# Patient Record
Sex: FEMALE | Race: WHITE | Employment: OTHER
[De-identification: names, ages, dates, MRNs, and addresses within clinical notes are randomized per-mention and may not be internally consistent; named-entity substitution may affect disease eponyms.]

---

## 2024-05-29 ENCOUNTER — HOSPITAL ENCOUNTER (OUTPATIENT)
Facility: HOSPITAL | Age: 84
Setting detail: RECURRING SERIES
Discharge: HOME | End: 2024-06-01
Payer: MEDICARE

## 2024-05-29 PROCEDURE — 97535 SELF CARE MNGMENT TRAINING: CPT

## 2024-05-29 PROCEDURE — 97110 THERAPEUTIC EXERCISES: CPT

## 2024-05-29 PROCEDURE — 97162 PT EVAL MOD COMPLEX 30 MIN: CPT

## 2024-05-29 NOTE — THERAPY EVALUATION
ANI DORADO [facility]  [address], [address], [place] [address] Ph:[phone] Fx: [phone]  Plan of Care / Statement of Necessity for Physical Therapy Services     Patient Name: Kajal Mitchell : 1940   Medical   Diagnosis: Left wrist pain [M25.532] Treatment Diagnosis: M25.532  LEFT WRIST PAIN       Onset Date:      Referral Source: Adelfo Orosco MD Start of Care (SOC): 2024   Prior Hospitalization: See medical history Provider #: [id number]   Prior Level of Function: Chronic pain in the wrist with lifting    Comorbidities: Chronic bronchitis, arthritis, Skin cancer, Pre lukemia of bone, HTN, Thyroid problems, alcohol use     Assessment / key information:  Kajal Mitchell is a 83 y.o. female with Dx: L wrist pain along the radial side starting in  after trying to lift a heavy bag and felt pain. Has had an injection for pain control which helped quite a bit. Had x-rays which were neg for fractures per pt report; unsure if it showed arthritis. Wears a splint a night to reduce flexion and radial deviation. Has more pain with gripping and pulling, twisting, and certain random motions. Reports her hands will fall asleep at night occasionally.  Notes she plays golf and it doesn't hurt with her swing, but hurts to carry her bag. Pt rates pain as 9/10 max, 0/10 at best, 0/10 currently.    Objective:    Palpation TTP along ABDuctor pollicis > Extensor pollicis, scaphoid.   Wrist ROM Flexion R 60 L 71, Ext R 72 L 68, RD R 28 L 16, UD R 45 L 32, Sup R 95 L 96, Pron R 96 L 97  Strength: Flexion R 4/5 L 4-/5, Ext R 4/5 L 4-/5, RD R 4/5 L 4/5, UD R 4/5 L 4/5, Sup R 4/5 L 3+/5, Pron R 4/5 L 3+/5, Discomfort with resistance to thumb extension and ABD  Special Tests: + Finkelstein    strength in kgs: R 14, 12, 14; L 10, 10, 10  Current deficits include decreased  and wrist strength consistent with chronic pollicis tendon strain and

## 2024-05-29 NOTE — PROGRESS NOTES
PHYSICAL / OCCUPATIONAL THERAPY - DAILY TREATMENT NOTE (updated )  For Eval visit    Patient Name: Kajal Mitchell    Date: 2024    : 1940  Insurance: Payor: MEDICARE / Plan: MEDICARE PART A AND B / Product Type: *No Product type* /      Patient  verified yes     Visit #   Current / Total 1 16   Time   In / Out 1:45 2:23   Pain   In / Out 0 0   Subjective Functional Status/Changes: See POC     TREATMENT AREA =  Left wrist pain [M25.532]    OBJECTIVE           15 min   Eval - untimed                      Therapeutic Procedures:  Tx Min Billable or 1:1 Min (if diff from Tx Min) Procedure, Rationale, Specifics   8  31266 Therapeutic Exercise (timed):  increase ROM, strength, coordination, balance, and proprioception to improve patient's ability to progress to PLOF and address remaining functional goals. (see flow sheet as applicable)     Details if applicable:       42450 Neuromuscular Re-Education (timed):  improve balance, coordination, kinesthetic sense, posture, core stability and proprioception to improve patient's ability to develop conscious control of individual muscles and awareness of position of extremities in order to progress to PLOF and address remaining functional goals. (see flow sheet as applicable)     Details if applicable:       65478 Manual Therapy (timed):  decrease pain, increase ROM, increase tissue extensibility, and decrease trigger points to improve patient's ability to progress to PLOF and address remaining functional goals.  The manual therapy interventions were performed at a separate and distinct time from the therapeutic activities interventions . (see flow sheet as applicable)     Details if applicable:     15  11094 Self Care/Home Management (timed):  improve patient knowledge and understanding of pain reducing techniques, positioning, posture/ergonomics, home safety, activity modification, diagnosis/prognosis, and physical therapy expectations, procedures and

## 2024-06-05 ENCOUNTER — HOSPITAL ENCOUNTER (OUTPATIENT)
Facility: HOSPITAL | Age: 84
Setting detail: RECURRING SERIES
Discharge: HOME | End: 2024-06-08
Payer: MEDICARE

## 2024-06-05 PROCEDURE — 97110 THERAPEUTIC EXERCISES: CPT

## 2024-06-05 PROCEDURE — 97140 MANUAL THERAPY 1/> REGIONS: CPT

## 2024-06-05 PROCEDURE — 97112 NEUROMUSCULAR REEDUCATION: CPT

## 2024-06-05 PROCEDURE — 97530 THERAPEUTIC ACTIVITIES: CPT

## 2024-06-05 NOTE — PROGRESS NOTES
posture/ergonomics, home safety, activity modification, diagnosis/prognosis, and physical therapy expectations, procedures and progression  to improve patient's ability to progress to PLOF and address remaining functional goals.  (see flow sheet as applicable)     Details if applicable:     38  [facility] Totals Reminder: bill using total billable min of TIMED therapeutic procedures (example: do not include dry needle or estim unattended, both untimed codes, in totals to left)  8-22 min = 1 unit; 23-37 min = 2 units; 38-52 min = 3 units; 53-67 min = 4 units; 68-82 min = 5 units   Total Total     [x]  Patient Education billed concurrently with other procedures   [x] Review HEP    [] Progressed/Changed HEP, detail:    [] Other detail:       Objective Information/Functional Measures/Assessment    MT for joint mobs along the wrist and thumb f/b exercises and rationale for therex. Answered all pt questions and given new HEP using hammer for RD and supination/pronation. Explained the reasoning for holds with thumb extension and eccentric motions to improve extensor tendon health. Will progress as tolerated.    Patient will continue to benefit from skilled PT / OT services to modify and progress therapeutic interventions, analyze and address functional mobility deficits, analyze and address ROM deficits, analyze and address strength deficits, analyze and address soft tissue restrictions, analyze and cue for proper movement patterns, analyze and modify for postural abnormalities, and instruct in home and community integration to address functional deficits and attain remaining goals.    Progress toward goals / Updated goals:  []  See PN    Semi compliant with HEP    Next PN / RC due: 6/29/24  Auth due: as med nec 12/31/24    PLAN  yes Continue plan of care  [x]  Upgrade activities as tolerated  []  Discharge due to :  []  Other:    Davidson Aguilera, PT    6/5/2024    12:24 PM    Future Appointments   Date Time Provider Department

## 2024-06-12 ENCOUNTER — HOSPITAL ENCOUNTER (OUTPATIENT)
Facility: HOSPITAL | Age: 84
Setting detail: RECURRING SERIES
Discharge: HOME | End: 2024-06-15
Payer: MEDICARE

## 2024-06-12 PROCEDURE — 97112 NEUROMUSCULAR REEDUCATION: CPT

## 2024-06-12 PROCEDURE — 97110 THERAPEUTIC EXERCISES: CPT

## 2024-06-12 PROCEDURE — 97140 MANUAL THERAPY 1/> REGIONS: CPT

## 2024-06-12 NOTE — PROGRESS NOTES
PHYSICAL / OCCUPATIONAL THERAPY - DAILY TREATMENT NOTE (updated )    Patient Name: Kajal Mitchell    Date: 2024    : 1940  Insurance: Payor: MEDICARE / Plan: MEDICARE PART A AND B / Product Type: *No Product type* /      Patient  verified yes     Visit #   Current / Total 3 16   Time   In / Out 3:00 3:38   Pain   In / Out 2 2   Subjective Functional Status/Changes: She played 18holes and it went good. Pt relays her medical history. Says she thinks lifting her golf bag awkwardly was the cause. No soreness after last time. She will play golf on Friday for her Birthday.      TREATMENT AREA =  Left wrist pain [M25.532]    OBJECTIVE      Therapeutic Procedures:  Tx Min Billable or 1:1 Min (if diff from Tx Min) Procedure, Rationale, Specifics   8  66646 Therapeutic Exercise (timed):  increase ROM, strength, coordination, balance, and proprioception to improve patient's ability to progress to PLOF and address remaining functional goals. (see flow sheet as applicable)     Details if applicable:     15  30660 Neuromuscular Re-Education (timed):  improve balance, coordination, kinesthetic sense, posture, core stability and proprioception to improve patient's ability to develop conscious control of individual muscles and awareness of position of extremities in order to progress to PLOF and address remaining functional goals. (see flow sheet as applicable)     Details if applicable:     15  26740 Manual Therapy (timed):  decrease pain, increase ROM, increase tissue extensibility, and decrease trigger points to improve patient's ability to progress to PLOF and address remaining functional goals.  The manual therapy interventions were performed at a separate and distinct time from the therapeutic activities interventions . (see flow sheet as applicable)     Details if applicable:  mobs/PROM to wrist all directions, Thumb distraction and oscillations, wrist oscillations. Thumb ABD stretch and CMC mobs for

## 2024-06-19 ENCOUNTER — HOSPITAL ENCOUNTER (OUTPATIENT)
Facility: HOSPITAL | Age: 84
Setting detail: RECURRING SERIES
Discharge: HOME | End: 2024-06-22
Payer: MEDICARE

## 2024-06-19 PROCEDURE — 97140 MANUAL THERAPY 1/> REGIONS: CPT

## 2024-06-19 PROCEDURE — 97535 SELF CARE MNGMENT TRAINING: CPT

## 2024-06-19 PROCEDURE — 97110 THERAPEUTIC EXERCISES: CPT

## 2024-06-19 NOTE — PROGRESS NOTES
PHYSICAL / OCCUPATIONAL THERAPY - DAILY TREATMENT NOTE (updated )    Patient Name: Kajal Mitchell    Date: 2024    : 1940  Insurance: Payor: MEDICARE / Plan: MEDICARE PART A AND B / Product Type: *No Product type* /      Patient  verified yes     Visit #   Current / Total 4 16   Time   In / Out 1:40 2:18   Pain   In / Out 0 0   Subjective Functional Status/Changes: Played golf and some fatigue ni the hand and the wrist.      TREATMENT AREA =  Left wrist pain [M25.532]    OBJECTIVE      Therapeutic Procedures:  Tx Min Billable or 1:1 Min (if diff from Tx Min) Procedure, Rationale, Specifics   15  86956 Therapeutic Exercise (timed):  increase ROM, strength, coordination, balance, and proprioception to improve patient's ability to progress to PLOF and address remaining functional goals. (see flow sheet as applicable)     Details if applicable:       82870 Neuromuscular Re-Education (timed):  improve balance, coordination, kinesthetic sense, posture, core stability and proprioception to improve patient's ability to develop conscious control of individual muscles and awareness of position of extremities in order to progress to PLOF and address remaining functional goals. (see flow sheet as applicable)     Details if applicable:     15  91452 Manual Therapy (timed):  decrease pain, increase ROM, increase tissue extensibility, and decrease trigger points to improve patient's ability to progress to PLOF and address remaining functional goals.  The manual therapy interventions were performed at a separate and distinct time from the therapeutic activities interventions . (see flow sheet as applicable)     Details if applicable:  mobs/PROM to wrist all directions, Thumb distraction and oscillations, wrist oscillations. Thumb ABD stretch and CMC mobs for ABD   8  63580 Self Care/Home Management (timed):  improve patient knowledge and understanding of pain reducing techniques, positioning,

## 2024-06-21 ENCOUNTER — APPOINTMENT (OUTPATIENT)
Facility: HOSPITAL | Age: 84
End: 2024-06-21
Payer: MEDICARE

## 2024-06-24 ENCOUNTER — HOSPITAL ENCOUNTER (OUTPATIENT)
Facility: HOSPITAL | Age: 84
Setting detail: RECURRING SERIES
Discharge: HOME | End: 2024-06-27
Payer: MEDICARE

## 2024-06-24 PROCEDURE — 97140 MANUAL THERAPY 1/> REGIONS: CPT

## 2024-06-24 PROCEDURE — 97110 THERAPEUTIC EXERCISES: CPT

## 2024-06-24 NOTE — PROGRESS NOTES
PHYSICAL / OCCUPATIONAL THERAPY - DAILY TREATMENT NOTE (updated )    Patient Name: Kajal Mitchell    Date: 2024    : 1940  Insurance: Payor: MEDICARE / Plan: MEDICARE PART A AND B / Product Type: *No Product type* /      Patient  verified yes     Visit #   Current / Total 5 16   Time   In / Out 946 AM 1020 AM   Pain   In / Out 0/10 0/10   Subjective Functional Status/Changes: Friday morning played golf and did ok. Had an \"accident\" at 230 AM on Saturday(24) - vomitted a lot due to prilosec. Pt reports also using a  on Sat and used it for about 10 minutes then had to stop because hurt but also cause it didn't help.    Pt reports she doesn't have a MD appt to discuss her hand. But she also doesn't want to see the hand specialist who gave her the injections again because they weren't pro PT. Reports PT has helped her a lot w/ ADLs and golfing. Patient denies falls or red flags since last visit.    Pt arrived 6 minutes past appt time     TREATMENT AREA =  Left wrist pain [M25.532]    OBJECTIVE      Therapeutic Procedures:  Tx Min Billable or 1:1 Min (if diff from Tx Min) Procedure, Rationale, Specifics   14  07370 Therapeutic Exercise (timed):  increase ROM, strength, coordination, balance, and proprioception to improve patient's ability to progress to PLOF and address remaining functional goals. (see flow sheet as applicable)     Details if applicable:       13198 Neuromuscular Re-Education (timed):  improve balance, coordination, kinesthetic sense, posture, core stability and proprioception to improve patient's ability to develop conscious control of individual muscles and awareness of position of extremities in order to progress to PLOF and address remaining functional goals. (see flow sheet as applicable)     Details if applicable:     20  20962 Manual Therapy (timed):  decrease pain, increase ROM, increase tissue extensibility, and decrease trigger points to improve

## 2024-06-26 ENCOUNTER — HOSPITAL ENCOUNTER (OUTPATIENT)
Facility: HOSPITAL | Age: 84
Setting detail: RECURRING SERIES
Discharge: HOME | End: 2024-06-29
Payer: MEDICARE

## 2024-06-26 PROCEDURE — 97535 SELF CARE MNGMENT TRAINING: CPT

## 2024-06-26 PROCEDURE — 97140 MANUAL THERAPY 1/> REGIONS: CPT

## 2024-06-26 PROCEDURE — 97110 THERAPEUTIC EXERCISES: CPT

## 2024-06-26 NOTE — THERAPY RECERTIFICATION
ANI DORADO [facility]  [address],[place] [address] - Ph: [phone]  Fx: [phone]  PHYSICAL THERAPY PROGRESS NOTE  Patient Name: Kajal Mitchell : 1940   Treatment/Medical Diagnosis: Left wrist pain [M25.532] / M25.532  LEFT WRIST PAIN    Referral Source: Adelfo Orosco MD     Date of Initial Visit: 24 Attended Visits: 6 Missed Visits: 0     SUMMARY OF TREATMENT  Pt seen in clinic for to address Left wrist pain [M25.532]. Pt has been assessed, completed therapeutic exercises, neuromuscular re-ed, therapeutic functional activity, received manual therapy intervention, self-care strategies, HEP techniques and pt ed on condition consistency and follow-through. -    Subjective: Pt reports 40% of where patient wants to be. Pt reports improvements in strength, less specific to the thumb, pain intensity is down, the frequency is less but she is avoiding things. Pt reports continued difficulties in fatigue in the arm and wrist after using a  or playing 18 holes of golf.     CURRENT STATUS  Pt seen for 6 visits to address left wrist pain. Pt subjectively reports \"better\" but is unable to quantify her progress in clinic. Overall seemingly progressing well through rehab at this time. Pt does fatigue in session and demonstrates overall weakness and poor tolerance to \"discomfort\" during drills. Will continue to progress strength and function as able     GOALS:  Short Term Goals: To be accomplished in 4 weeks  Independent with HEP to progress to meet goals. Variable (24)  2. Pt to report decreased max pain levels less than 6/10 for improvement in ADLs. 0-1/10 \"but I am fatigued with activity\" (24  3. Pt to report 50% improvement in pain with lifting to improve golf activities. \"It's less, but how much less, I don't know\" (24)     Long Term Goals: To be accomplished in 8 weeks  Improve functional outcome score

## 2024-06-26 NOTE — PROGRESS NOTES
wrist all directions; PA carpal mobilizations   10  36065 Self Care/Home Management (timed):  improve patient knowledge and understanding of pain reducing techniques, positioning, posture/ergonomics, home safety, activity modification, diagnosis/prognosis, and physical therapy expectations, procedures and progression  to improve patient's ability to progress to PLOF and address remaining functional goals.  (see flow sheet as applicable)     Details if applicable:  activity adjustment, joint pathology.   38  [facility] Totals Reminder: bill using total billable min of TIMED therapeutic procedures (example: do not include dry needle or estim unattended, both untimed codes, in totals to left)  8-22 min = 1 unit; 23-37 min = 2 units; 38-52 min = 3 units; 53-67 min = 4 units; 68-82 min = 5 units   Total Total     [x]  Patient Education billed concurrently with other procedures   [x] Review HEP    [] Progressed/Changed HEP, detail:    [] Other detail:       Objective Information/Functional Measures/Assessment    Pt seen for 6 visits to address left wrist pain. Pt subjectively reports \"better\" but is unable to quantify her progress in clinic. Overall seemingly progressing well through rehab at this time. Pt does fatigue in session and demonstrates overall weakness and poor tolerance to \"discomfort\" during drills. Will continue to progress strength and function as able.     Patient will continue to benefit from skilled PT / OT services to modify and progress therapeutic interventions, analyze and address functional mobility deficits, analyze and address ROM deficits, analyze and address strength deficits, analyze and address soft tissue restrictions, analyze and cue for proper movement patterns, analyze and modify for postural abnormalities, and instruct in home and community integration to address functional deficits and attain remaining goals.    Progress toward goals / Updated goals:  []  See PN    Short Term Goals: To be

## 2024-07-10 ENCOUNTER — HOSPITAL ENCOUNTER (OUTPATIENT)
Facility: HOSPITAL | Age: 84
Setting detail: RECURRING SERIES
Discharge: HOME | End: 2024-07-13
Payer: MEDICARE

## 2024-07-10 PROCEDURE — 97110 THERAPEUTIC EXERCISES: CPT

## 2024-07-10 PROCEDURE — 97140 MANUAL THERAPY 1/> REGIONS: CPT

## 2024-07-10 NOTE — PROGRESS NOTES
PHYSICAL / OCCUPATIONAL THERAPY - DAILY TREATMENT NOTE (updated )    Patient Name: Kajal Mitchell    Date: 7/10/2024    : 1940  Insurance: Payor: MEDICARE / Plan: MEDICARE PART A AND B / Product Type: *No Product type* /      Patient  verified yes     Visit #   Current / Total 7 16   Time   In / Out 3:00 3:38   Pain   In / Out 0/10 0/10   Subjective Functional Status/Changes:   She played golf on Tuesday. She thinks she is getting stronger. Will play golf at the [place] this week.       TREATMENT AREA =  Left wrist pain [M25.532]    OBJECTIVE    Therapeutic Procedures:  Tx Min Billable or 1:1 Min (if diff from Tx Min) Procedure, Rationale, Specifics   23  07536 Therapeutic Exercise (timed):  increase ROM, strength, coordination, balance, and proprioception to improve patient's ability to progress to PLOF and address remaining functional goals. (see flow sheet as applicable)     Details if applicable:       05262 Neuromuscular Re-Education (timed):  improve balance, coordination, kinesthetic sense, posture, core stability and proprioception to improve patient's ability to develop conscious control of individual muscles and awareness of position of extremities in order to progress to PLOF and address remaining functional goals. (see flow sheet as applicable)     Details if applicable:     15  79062 Manual Therapy (timed):  decrease pain, increase ROM, increase tissue extensibility, and decrease trigger points to improve patient's ability to progress to PLOF and address remaining functional goals.  The manual therapy interventions were performed at a separate and distinct time from the therapeutic activities interventions . (see flow sheet as applicable)     Details if applicable:  mobs/PROM to wrist all directions; PA carpal mobilizations. STM to the thenar eminence.      91184 Self Care/Home Management (timed):  improve patient knowledge and understanding of pain reducing techniques,

## 2024-07-16 ENCOUNTER — TELEPHONE (OUTPATIENT)
Facility: HOSPITAL | Age: 84
End: 2024-07-16

## 2024-07-22 ENCOUNTER — HOSPITAL ENCOUNTER (OUTPATIENT)
Facility: HOSPITAL | Age: 84
Setting detail: RECURRING SERIES
Discharge: HOME | End: 2024-07-25
Payer: MEDICARE

## 2024-07-22 PROCEDURE — 97110 THERAPEUTIC EXERCISES: CPT

## 2024-07-22 PROCEDURE — 97140 MANUAL THERAPY 1/> REGIONS: CPT

## 2024-07-22 NOTE — PROGRESS NOTES
PHYSICAL / OCCUPATIONAL THERAPY - DAILY TREATMENT NOTE (updated )    Patient Name: Kajal Mitchell    Date: 2024    : 1940  Insurance: Payor: MEDICARE / Plan: MEDICARE PART A AND B / Product Type: *No Product type* /      Patient  verified yes     Visit #   Current / Total 8 16   Time   In / Out 1:42 2:20   Pain   In / Out 0/10 0/10   Subjective Functional Status/Changes:   Reports fatigue with playing golf 2 days in a row.        TREATMENT AREA =  Left wrist pain [M25.532]    OBJECTIVE    Therapeutic Procedures:  Tx Min Billable or 1:1 Min (if diff from Tx Min) Procedure, Rationale, Specifics   23  09529 Therapeutic Exercise (timed):  increase ROM, strength, coordination, balance, and proprioception to improve patient's ability to progress to PLOF and address remaining functional goals. (see flow sheet as applicable)     Details if applicable:       21518 Neuromuscular Re-Education (timed):  improve balance, coordination, kinesthetic sense, posture, core stability and proprioception to improve patient's ability to develop conscious control of individual muscles and awareness of position of extremities in order to progress to PLOF and address remaining functional goals. (see flow sheet as applicable)     Details if applicable:     15  81710 Manual Therapy (timed):  decrease pain, increase ROM, increase tissue extensibility, and decrease trigger points to improve patient's ability to progress to PLOF and address remaining functional goals.  The manual therapy interventions were performed at a separate and distinct time from the therapeutic activities interventions . (see flow sheet as applicable)     Details if applicable:  mobs/PROM to wrist all directions; PA carpal mobilizations. STM to the thenar eminence.      42043 Self Care/Home Management (timed):  improve patient knowledge and understanding of pain reducing techniques, positioning, posture/ergonomics, home safety, activity

## 2024-07-24 ENCOUNTER — HOSPITAL ENCOUNTER (OUTPATIENT)
Facility: HOSPITAL | Age: 84
Setting detail: RECURRING SERIES
Discharge: HOME | End: 2024-07-27
Payer: MEDICARE

## 2024-07-24 PROCEDURE — 97110 THERAPEUTIC EXERCISES: CPT

## 2024-07-24 PROCEDURE — 97140 MANUAL THERAPY 1/> REGIONS: CPT

## 2024-07-24 NOTE — PROGRESS NOTES
PHYSICAL / OCCUPATIONAL THERAPY - DAILY TREATMENT NOTE (updated )    Patient Name: Kajal Mitchell    Date: 2024    : 1940  Insurance: Payor: MEDICARE / Plan: MEDICARE PART A AND B / Product Type: *No Product type* /      Patient  verified yes     Visit #   Current / Total 9 11   Time   In / Out 11:00 11:38   Pain   In / Out 0/10 0/10   Subjective Functional Status/Changes:   Pt reports improvements in strength and doing more. Some strain but no shooting pain She can now carry cartons of milk with no problem. Pt reports continued difficulties in fear of re-injury or worry about he healing with it.   I want to do 2 more visits.       TREATMENT AREA =  Left wrist pain [M25.532]    OBJECTIVE    Therapeutic Procedures:  Tx Min Billable or 1:1 Min (if diff from Tx Min) Procedure, Rationale, Specifics   23  18718 Therapeutic Exercise (timed):  increase ROM, strength, coordination, balance, and proprioception to improve patient's ability to progress to PLOF and address remaining functional goals. (see flow sheet as applicable)     Details if applicable:       91662 Neuromuscular Re-Education (timed):  improve balance, coordination, kinesthetic sense, posture, core stability and proprioception to improve patient's ability to develop conscious control of individual muscles and awareness of position of extremities in order to progress to PLOF and address remaining functional goals. (see flow sheet as applicable)     Details if applicable:     15  91446 Manual Therapy (timed):  decrease pain, increase ROM, increase tissue extensibility, and decrease trigger points to improve patient's ability to progress to PLOF and address remaining functional goals.  The manual therapy interventions were performed at a separate and distinct time from the therapeutic activities interventions . (see flow sheet as applicable)     Details if applicable:  mobs/PROM to wrist all directions; PA carpal mobilizations. STM to

## 2024-07-24 NOTE — THERAPY RECERTIFICATION
ANI DORADO [facility]  [address], [address] [address] - Phone: [phone]  Fax: [phone]  CONTINUED PLAN OF CARE/RECERTIFICATION FOR PHYSICAL THERAPY          Patient Name: Kajal Mitchell : 1940   Treatment/Medical Diagnosis: Left wrist pain [M25.532]   Onset Date:     Referral Source: Adelfo Orosco MD Start of Care (SOC): 24   Prior Hospitalization: See Medical History Provider #: [id number]   Prior Level of Function: Chronic pain in the wrist w lifting   Comorbidities: Chronic bronchitis, arthritis, Skin cancer, Pre lukemia of bone, HTN, Thyroid problems, alcohol use    Visits from SOC: 9 Missed Visits: 0   Subjective: Pt reports improvements in strength and doing more. Some strain but no shooting pain She can now carry cartons of milk with no problem. Pt reports continued difficulties in fear of re-injury or worry about he healing with it.   \"I want to do 2 more visits.\"    Progress to Goals:  Short Term Goals: To be accomplished in 4 weeks  Independent with HEP to progress to meet goals. Variable (24)  2. Pt to report decreased max pain levels less than 6/10 for improvement in ADLs. MET: No real pains \"but I am fatigued with activity\" (24  3. Pt to report 50% improvement in pain with lifting to improve golf activities. Current: MET: 50% improved \"I'll never be 100%. Somewhere in the middle\"  (24)     Long Term Goals: To be accomplished in 8 weeks  Improve functional outcome score by 9 to show a significant functional change. (qDASH was: 45%)  Current MET: 20% (24)  2. Pt to report decreased max pain levels less than 3/10 for improvement in QoL. MET to date (24)  3. Pt to report 75% improvement in pain to enjoy leisure activities. Current: Progressing 50% \"Somewhere in the middle\" (24)    Key Functional Changes/Progress: Key : 4lbs bilat  Power : R: 35 lbs, L: 32 lbs  Pt seen for

## 2024-07-29 ENCOUNTER — HOSPITAL ENCOUNTER (OUTPATIENT)
Facility: HOSPITAL | Age: 84
Setting detail: RECURRING SERIES
Discharge: HOME | End: 2024-08-01
Payer: MEDICARE

## 2024-07-29 PROCEDURE — 97110 THERAPEUTIC EXERCISES: CPT

## 2024-07-29 PROCEDURE — 97140 MANUAL THERAPY 1/> REGIONS: CPT

## 2024-07-29 NOTE — PROGRESS NOTES
PHYSICAL / OCCUPATIONAL THERAPY - DAILY TREATMENT NOTE (updated )    Patient Name: Kajal Mitchell    Date: 2024    : 1940  Insurance: Payor: MEDICARE / Plan: MEDICARE PART A AND B / Product Type: *No Product type* /      Patient  verified yes     Visit #   Current / Total 10 11   Time   In / Out 1:00 1:38   Pain   In / Out 0/10 0/10   Subjective Functional Status/Changes:   Says doing well in terms of the hand.        TREATMENT AREA =  Left wrist pain [M25.532]    OBJECTIVE    Therapeutic Procedures:  Tx Min Billable or 1:1 Min (if diff from Tx Min) Procedure, Rationale, Specifics   23  78990 Therapeutic Exercise (timed):  increase ROM, strength, coordination, balance, and proprioception to improve patient's ability to progress to PLOF and address remaining functional goals. (see flow sheet as applicable)     Details if applicable:       81762 Neuromuscular Re-Education (timed):  improve balance, coordination, kinesthetic sense, posture, core stability and proprioception to improve patient's ability to develop conscious control of individual muscles and awareness of position of extremities in order to progress to PLOF and address remaining functional goals. (see flow sheet as applicable)     Details if applicable:     15  36019 Manual Therapy (timed):  decrease pain, increase ROM, increase tissue extensibility, and decrease trigger points to improve patient's ability to progress to PLOF and address remaining functional goals.  The manual therapy interventions were performed at a separate and distinct time from the therapeutic activities interventions . (see flow sheet as applicable)     Details if applicable:  mobs/PROM to wrist all directions; PA carpal mobilizations. STM to the thenar eminence. MHP during.      76070 Self Care/Home Management (timed):  improve patient knowledge and understanding of pain reducing techniques, positioning, posture/ergonomics, home safety, activity

## 2024-07-31 ENCOUNTER — HOSPITAL ENCOUNTER (OUTPATIENT)
Facility: HOSPITAL | Age: 84
Setting detail: RECURRING SERIES
Discharge: HOME | End: 2024-08-03
Payer: MEDICARE

## 2024-07-31 PROCEDURE — 97110 THERAPEUTIC EXERCISES: CPT

## 2024-07-31 NOTE — PROGRESS NOTES
8/7/24 / 8/7/24  Auth due: as med nec 12/31/24  Short Term Goals: To be accomplished in 4 weeks  Independent with HEP to progress to meet goals. Variable (7/31/24)     Long Term Goals: To be accomplished in 8 weeks  3. Pt to report 75% improvement in pain to enjoy leisure activities. Current: 100% with leisure activity, MET (7/31/24)  New Goal  4. Pt to improve power  to 38+ lbs and 6 lbs Key  bilat towards improved carrying in the home (Was: Key : 4lbs bilat, Power : R: 35 lbs, L: 32 lbs) Current: No change from last test last week, Key : No change, 4lbs bilat, Power : R: 32 lbs, L: 32 lbs (7/31/24)    PLAN  yes Continue plan of care  [x]  Upgrade activities as tolerated  []  Discharge due to :  []  Other:    Devan Farris, CHRISTIAN    7/31/2024    7:50 AM    Future Appointments   Date Time Provider Department Center   7/31/2024  1:40 PM Devan Farris, PT [facility]

## 2024-07-31 NOTE — THERAPY DISCHARGE
[phone].   Thank you for allowing us to assist in the care of your patient.    Devan Farris, PT       7/31/2024       7:54 AM    Not Medicaid Ins, no signature required for DC